# Patient Record
Sex: MALE
[De-identification: names, ages, dates, MRNs, and addresses within clinical notes are randomized per-mention and may not be internally consistent; named-entity substitution may affect disease eponyms.]

---

## 2017-01-03 PROBLEM — Z00.00 ENCOUNTER FOR PREVENTIVE HEALTH EXAMINATION: Status: ACTIVE | Noted: 2017-01-03

## 2017-01-04 ENCOUNTER — APPOINTMENT (OUTPATIENT)
Dept: ULTRASOUND IMAGING | Facility: CLINIC | Age: 42
End: 2017-01-04

## 2017-01-04 ENCOUNTER — OUTPATIENT (OUTPATIENT)
Dept: OUTPATIENT SERVICES | Facility: HOSPITAL | Age: 42
LOS: 1 days | End: 2017-01-04
Payer: COMMERCIAL

## 2017-01-04 DIAGNOSIS — Z00.8 ENCOUNTER FOR OTHER GENERAL EXAMINATION: ICD-10-CM

## 2017-01-04 PROCEDURE — 76536 US EXAM OF HEAD AND NECK: CPT

## 2019-07-24 ENCOUNTER — APPOINTMENT (OUTPATIENT)
Dept: INFECTIOUS DISEASE | Facility: CLINIC | Age: 44
End: 2019-07-24
Payer: COMMERCIAL

## 2019-07-24 VITALS
HEART RATE: 82 BPM | WEIGHT: 241 LBS | OXYGEN SATURATION: 100 % | BODY MASS INDEX: 29.97 KG/M2 | TEMPERATURE: 97.7 F | DIASTOLIC BLOOD PRESSURE: 92 MMHG | HEIGHT: 75 IN | SYSTOLIC BLOOD PRESSURE: 155 MMHG

## 2019-07-24 DIAGNOSIS — I10 ESSENTIAL (PRIMARY) HYPERTENSION: ICD-10-CM

## 2019-07-24 DIAGNOSIS — Z80.3 FAMILY HISTORY OF MALIGNANT NEOPLASM OF BREAST: ICD-10-CM

## 2019-07-24 DIAGNOSIS — Z83.3 FAMILY HISTORY OF DIABETES MELLITUS: ICD-10-CM

## 2019-07-24 DIAGNOSIS — Z80.42 FAMILY HISTORY OF MALIGNANT NEOPLASM OF PROSTATE: ICD-10-CM

## 2019-07-24 DIAGNOSIS — A69.20 LYME DISEASE, UNSPECIFIED: ICD-10-CM

## 2019-07-24 PROCEDURE — 99203 OFFICE O/P NEW LOW 30 MIN: CPT

## 2019-07-24 RX ORDER — DOXYCYCLINE 100 MG/1
100 CAPSULE ORAL
Qty: 28 | Refills: 0 | Status: ACTIVE | COMMUNITY
Start: 2019-07-24 | End: 1900-01-01

## 2019-07-24 NOTE — ASSESSMENT
[FreeTextEntry1] : 43 year old with acute Lyme, likely with spirochetemic phase given multiple areas of erythema migrans at time of presentation.  Given likelihood was spirochetemic, would favor slightly longer course of treatment; therefore will give patient another 2 weeks of Doxycycline.  \par \par No evidence of more advanced phases of Lyme.\par \par Advised patients that western blot may stay positive for life and therefore future treatment decisions will rely more heavily on clinical factors.  \par \par There is recent evidence that after treatment of primary Lyme, C6 (VISE) screening assay can revert to negative; therefore, might be prudent to re-check the Lyme VISE screening test in about a year so, if reverted to negative, that might be useful in future if other exposures happen.\par \par

## 2019-07-24 NOTE — HISTORY OF PRESENT ILLNESS
[FreeTextEntry1] : Health 43 year old who lives in [facility] [place] and has cats, often cats bring brings ticks in - mostly dog ticks.  About 2 months ago patient found engorged deer tick on cat.  Removed and discarded, but 2 days later patient found engorged deer tick on floor of his office.  Never saw bite site, but developed headaches, constipation, fatigue.  One night in shower saw large red ring on stomach.  Clear in center, no target.  Had 5 others other annular rash sites on his body as well (left arm, both calves, left thigh).   \par \par Saw MD Lyme C6 strongly positive and western blot IgM and IgG both positive.  Given Doxycycline x 2 weeks - finished two weeks ago.  All systemic symptoms and rashes resolved after 3 days.\par \par no fever /chills / sweats now\par \par \par

## 2019-07-24 NOTE — CONSULT LETTER
[Dear  ___] : Dear  [unfilled], [( Thank you for referring [unfilled] for consultation for _____ )] : Thank you for referring [unfilled] for consultation for [unfilled] [Please see my note below.] : Please see my note below. [FreeTextEntry3] : Thank you for allowing me to participate in the care of this patient.  Please feel free to contact me with any questions.\par \par Sincerely, \par Michael I. Oppenheim, MD\par \par  [FreeTextEntry2] : Dr. Stoney Robles

## 2019-07-24 NOTE — PHYSICAL EXAM
[General Appearance - Alert] : alert [General Appearance - In No Acute Distress] : in no acute distress [General Appearance - Well Nourished] : well nourished [General Appearance - Well-Appearing] : healthy appearing [Sclera] : the sclera and conjunctiva were normal [PERRL With Normal Accommodation] : pupils were equal in size, round, reactive to light [Extraocular Movements] : extraocular movements were intact [Outer Ear] : the ears and nose were normal in appearance [Oropharynx] : the oropharynx was normal with no thrush [Neck Appearance] : the appearance of the neck was normal [Neck Cervical Mass (___cm)] : no neck mass was observed [Auscultation Breath Sounds / Voice Sounds] : lungs were clear to auscultation bilaterally [Heart Rate And Rhythm] : heart rate was normal and rhythm regular [Heart Sounds Gallop] : no gallops [Heart Sounds] : normal S1 and S2 [Murmurs] : no murmurs [Heart Sounds Pericardial Friction Rub] : no pericardial rub [Bowel Sounds] : normal bowel sounds [Abdomen Soft] : soft [Abdomen Tenderness] : non-tender [Abdomen Mass (___ Cm)] : no abdominal mass palpated [FreeTextEntry1] : No cervical, supraclavicular, axillary or inguinal LN [Musculoskeletal - Swelling] : no joint swelling [Range of Motion to Joints] : range of motion to joints [Skin Color & Pigmentation] : normal skin color and pigmentation [] : no rash [Cranial Nerves Oculomotor (III)] : the extraocular motions were intact [Skin Lesions] : no skin lesions [Cranial Nerves Facial (VII)] : facial strength was intact bilaterally [Cranial Nerves Trigeminal (V)] : sensation to the face and masseter strength were intact [Cranial Nerves Vestibulocochlear (VIII)] : hearing was intact [Cranial Nerves Glossopharyngeal (IX)] : there was normal movement of the soft palate and normal gag [Cranial Nerves Hypoglossal (XII)] : there was no tongue deviation with protrusion [Cranial Nerves Accessory (XI - Cranial And Spinal)] : shoulder shrug was intact bilaterally [Dysdiadochokinesia Bilaterally] : not present [Sensation Tactile Decrease] : light touch was intact [Coordination - Dysmetria Impaired Finger-to-Nose Bilateral] : not present [Coordination - Dysmetria Impaired Heel-to-Shin Bilateral] : not present [1+] : left 1+

## 2019-07-24 NOTE — REVIEW OF SYSTEMS
[Fever] : no fever [Chills] : no chills [Body Aches] : no body aches [Feeling Tired] : not feeling tired [Feeling Sick] : not feeling sick [Normal Appetite] : normal appetite [Eye Pain] : no eye pain [Eyesight Problems] : no eyesight problems [Discharge From Eyes] : no purulent discharge from the eyes [Earache] : no earache [Nasal Discharge] : no nasal discharge [Loss Of Hearing] : no hearing loss [Hoarseness] : no hoarseness [Chest Pain] : no chest pain [Lower Ext Edema] : no extremity edema [Palpitations] : no palpitations [Shortness Of Breath] : no shortness of breath [Wheezing] : no wheezing [Cough] : no cough [SOB on Exertion] : no shortness of breath during exertion [Pleuritic Chest Pain] : no pleuritic chest pain [Sputum] : not coughing up ~M sputum [Abdominal Pain] : no abdominal pain [Vomiting] : no vomiting [Constipation] : no constipation [Diarrhea] : no diarrhea [Genital Lesion] : no genital lesions [Dysuria] : no dysuria [Testicular Pain] : no testicular pain [Joint Pain] : no joint pain [Joint Swelling] : no joint swelling [Joint Stiffness] : no joint stiffness [Skin Lesions] : no skin lesions [Skin Wound] : no skin wound [Itching] : no itching

## 2022-02-09 ENCOUNTER — TRANSCRIPTION ENCOUNTER (OUTPATIENT)
Age: 47
End: 2022-02-09

## 2022-02-17 ENCOUNTER — TRANSCRIPTION ENCOUNTER (OUTPATIENT)
Age: 47
End: 2022-02-17